# Patient Record
Sex: MALE | Race: WHITE | NOT HISPANIC OR LATINO | Employment: UNEMPLOYED | URBAN - METROPOLITAN AREA
[De-identification: names, ages, dates, MRNs, and addresses within clinical notes are randomized per-mention and may not be internally consistent; named-entity substitution may affect disease eponyms.]

---

## 2018-01-10 NOTE — MISCELLANEOUS
Provider Comments  Provider Comments:   Patient did not show for his 8/31 appointment    PL      Signatures   Electronically signed by : ERNESTO Plata ; Sep  1 2016  2:30PM EST                       (Author)

## 2018-01-15 NOTE — PROGRESS NOTES
Assessment    1  Family history of malignant neoplasm (V16 9) (Z80 9) : Father   2  Family history of lung cancer (V16 1) (Z80 1) : Father   3  Acute renal failure with tubular necrosis (584 5) (N17 0)   4  Benign essential hypertension (401 1) (I10)    Plan  Acute renal failure with tubular necrosis    · (1) BASIC METABOLIC PROFILE; Status:Active; Requested for:08Yxp2888;    Perform:[facility]; CAB:82NYV9288;LDXJNMR; For:Acute renal failure with tubular necrosis; Ordered By:Neeta Faye;   · (1) BASIC METABOLIC PROFILE; Status:Active; Requested for:50Rku4288;    Perform:[facility]; RMF:68VLX9287;WPIGQUO; For:Acute renal failure with tubular necrosis; Ordered By:Neeta Faye;   · (1) MAGNESIUM; Status:Active; Requested for:79Jwo1125;    Perform:[facility]; BAX:65ERF3040;JDMCQTF; For:Acute renal failure with tubular necrosis; Ordered By:Stella Faye;   · (1) URINALYSIS w URINE C/S REFLEX (will reflex a microscopy if leukocytes, occult  blood, or nitrites are not within normal limits); Status:Active; Requested for:29Psq4970;    Perform:[facility]; PDR:62IZP8197;IUHIXES; For:Acute renal failure with tubular necrosis; Ordered By:Neeta Faye;   · Follow-up visit in 6 weeks Evaluation and Treatment  Follow-up  Status: Complete  Done:  78QIG5350 10:06AM   Ordered; For: Acute renal failure with tubular necrosis; Ordered By: Jeet Aldana Performed:  Due: 15GBL0836; Last Updated By: Nani Ray; 7/14/2016 10:05:23 AM  Benign essential hypertension    · (1) LIPID PANEL, FASTING; Status:Active; Requested for:99Znt6094;    Perform:[facility]; TPS:80ANO8359;FQQHUJG; For:Benign essential hypertension; Ordered By:Neyda, Deronda Dye;    Discussion/Summary    #1  Acute kidney injury secondary to ATN in the setting of ketoacidosis, hypotensive shock with the use of ACE inhibitor, HCTZ and nonsteroidal anti-inflammatory drugs contributing   Patient has been on intermittent use of Indocin for control of gout symptoms for 25 years but states that he only uses it during a flare  He was instructed not to use this medication anymore  Acute kidney injury has resolved with last blood work showing a creatinine down to 0 9  Baseline creatinine unknown  Repeat Urinalysis was negative for hematuria and proteinuria  Renal ultrasound shows normal echogenicity, no hydronephrosis  Right kidney was 14 9 cm and left kidney was 13 5 cm  The initial ultrasound in the hospital showed mild proximal hydroureter but repeat CT evaluation was negative  I carefully explained to Jacques Luis that prevention of chronic kidney disease entails good blood pressure control, excellent blood sugar control and control of hyperlipidemia  I could not find any results of lipid testing therefore I am checking this with next blood work in August   #2  Hypertension: Pressure is slightly above goal at this time  He has had several blood pressure readings at different providers since hospital discharge in both of those readings were acceptable ~130/80-90  Elevation in blood pressure reading may be somewhat of a white coat/anxiety reaction  Patient was very worried about coming for this office visit since he felt he may be told that he may need to start dialysis  He is currently taking Coreg 25 mg twice a day and recently amlodipine 5 mg was added since his diastolic reading was in the upper 80s to 90 when he saw his primary care doctor  Before his hospitalization he was taking Benazepril which he will likely need to resume taking at some point but at this time I am holding off on restarting an ACE inhibitor since amlodipine was recently started  I will likely resume Benazepril at half the dose he was taking previously which is 20 mg daily (he was taking 40 mg previously)  #3  Gout  Patient instructed not to use Indocin for control of gout   I did suggest tart cherry but that has not worked for him in the past  He may need to use intermittent steroid dosing but then his blood sugars will escalate  #4  Volume status  Essentially no edema noted  Patient euvolemic  Plan: Repeat labs now and in 4-6 weeks before next appointment  If renal function remains stable and blood pressure is elevated we will likely add Benazepril  May need to discontinue amlodipine and substitute ACE inhibitor since in the long run this will benefit him more  Next appointment in 4-6 weeks  The patient, patient's family was counseled regarding diagnostic results, instructions for management, risk factor reductions, prognosis, patient and family education, impressions, importance of compliance with treatment  total time of encounter was 60 minutes and 40 minutes was spent counseling  Extended time explaining hypertension and prevention of CKD   Possible side effects of new medications were reviewed with the patient/guardian today  The treatment plan was reviewed with the patient/guardian  The patient/guardian understands and agrees with the treatment plan      Reason For Visit  Hospital follow-up      History of Present Illness  Mr Meghan Hodge is a very pleasant 51-year-old gentleman who had a significant hospitalization from May 29 to June 4  He was found confused and wandering in the streets near his home and was brought to the hospital and was found to have ketoacidosis with a blood sugar over 600 and a serum creatinine of 12  He had no previous history of diabetes mellitus  He did notice that he was drinking and urinating more but because of the warm weather he did not think much of it  Of note he had a 30 pound weight gain in the past 4 months since he hurt his shoulder  His past medical history was significant for hypertension for approximately 10 years  He was in profound shock and was placed on pressor support  He had increased troponin secondary to demand ischemia  Due to abdominal pain he had endoscopic evaluation finding gastric ulcer   He will be having a repeat endoscopic procedure in approximately a month or 2 for follow-up  His hospitalization was also attack of gout in both of his knees  He had fluid removed and his knees injected with steroids  Uric acid was elevated but declined to 7 6 on the day of discharge  He had significant hyperuricemia with uric crystals  He did not require hemodialysis during his hospitalization  On the day of discharge creatinine had declined to 1 88  He was initially discharged and had several days of rehabilitation before returning home  He has been feeling great  He lost a significant amount of weight in a very short time during his hospitalization but has gained approximately 10 pounds back  He would like to continue to lose weight which he knows will help his blood sugar and his hypertension control  He really has no other issues at this time  His blood sugars are showing better control on recently adjusted Levemir/NovoLog  This was a very frightening time for Kristen Torres and he seems very dedicated to changing lifestyle to benefit health  Review of Systems    Constitutional: recent 10 lb weight loss, but no fever, no chills, no anorexia and no fatigue  Integumentary: No complaints of skin rash  Gastrointestinal: No complains of abdominal pain, no constipation or diarrhea, no nausea or vomiting  Respiratory: No complaints of shortness of breath, no cough, no productive sputum  Cardiovascular: No complaints of orthopnea, no PND, no chest pain, no palpitations, no lower extremity edema  Musculoskeletal: No complaints of joint pain or swelling  Neurological: No complaints of headache, no lightheadedness or dizziness  Genitourinary: No dysuria, no hematuria, no nocturia, no urinary frequency, no incomplete emptying of bladder, no foamy urine  Eyes: No complaints of eyesight problems or dryness of eyes  ENT: no complaints of hearing loss, no nasal discharge     Psychiatric: Not suicidal, no sleep disturbance, no anxiety or depression, no change in personality, no emotional problems  Past Medical History    The active problems and past medical history were reviewed and updated today  Surgical History    The surgical history was reviewed and updated today  Family History    The family history was reviewed and updated today  Social History  The social history was reviewed and updated today  The social history was reviewed and is unchanged  Current Meds   1  Coreg 25 MG Oral Tablet; Take 1 tablet twice daily; Therapy: 01XHC8099 to Recorded   2  NovoLOG 100 UNIT/ML Subcutaneous Solution; INJECT 5 UNIT 3 times daily; Therapy: 35PFR5829 to Recorded    The medication list was reviewed and updated today  Allergies    1  No Known Drug Allergies    Vitals  Vital Signs [Data Includes: Current Encounter]    ** Printed in Appendix #1 below  Physical Exam    ENT: External ears and nose appear normal      Eyes: Anicteric sclerae  Neck: No bruit heard over either carotid  JVD:  No JVD present  Pulmonary: Respiratory effort: No increased work of breathing or signs of respiratory distress  Auscultation of lungs: Clear to auscultation  Cardiovascular: Auscultation of heart: Normal rate and rhythm, normal S1 and S2, without murmurs  Abdomen: Non-tender, no masses  Extremities: No cyanosis, clubbing or edema  Pulses: Dorsalis Pedis and Posterior Tibial pulses normal    Rash: No rash present  Neurologic: Non Focal      Psychiatric: Orientation to person, place, and time: Normal   and Mood and affect: Normal     Back: No CVA tenderness        Results/Data  2 WEEKS Results   (1) RENAL FUNCTION PANEL 13Qyi2008 10:08AM [place] [facility]    Order Number: [id number]  TW Order Number: [id number]  TW Order Number: [id number] Order Number: [id number]     Test Name Result Flag Reference   ANION GAP (CALC) 11 mmol/L  4-13   ALBUMIN 3 6 g/dL  3 5-5 0   BLOOD UREA NITROGEN 18 mg/dL  5-25   National Kidney Disease Education Program recommendations are as follows:  GFR calculation is accurate only with a steady state creatinine  Chronic Kidney disease less than 60 ml/min/1 73 sq  meters  Kidney failure less than 15 ml/min/1 73 sq  meters     CALCIUM 9 1 mg/dL  8 3-10 1   CHLORIDE 101 mmol/L  100-108   CARBON DIOXIDE 25 mmol/L  21-32   CREATININE 0 91 mg/dL  0 60-1 30   Standardized to IDMS reference method   GLUCOSE,RANDM 166 mg/dL H    POTASSIUM 3 9 mmol/L  3 5-5 3   eGFR Non-African American      >60 0 ml/min/1 73sq m   SODIUM 137 mmol/L  136-145   PHOSPHORUS 3 0 mg/dL  2 7-4 5     (1) CBC/ PLT (NO DIFF) 44VMJ1214 10:08AM Aravind Counter   TW Order Number: [id number]  TW Order Number: [id number]  TW Order Number: [id number] Order Number: [id number]     Test Name Result Flag Reference   HEMATOCRIT 39 7 %  36 5-49 3   HEMOGLOBIN 12 4 g/dL  12 0-17 0   MCHC 31 2 g/dL L 31 4-37 4   MCH 27 7 pg  26 8-34 3   MCV 89 fL  82-98   PLATELET COUNT 236 Thousands/uL  149-390   RBC COUNT 4 47 Million/uL  3 88-5 62   RDW 14 6 %  11 6-15 1   WBC COUNT 4 46 Thousand/uL  4 31-10 16   MPV 11 1 fL  8 9-12 7     (1) MAGNESIUM 38Mny4451 10:08AM Aravind Counter   TW Order Number: [id number]  TW Order Number: [id number]  TW Order Number: [id number] Order Number: [id number]     Test Name Result Flag Reference   MAGNESIUM 1 9 mg/dL  1 6-2 6     (1) URINALYSIS w URINE C/S REFLEX (will reflex a microscopy if leukocytes, occult blood, or nitrites are not within normal limits) 58TGW8204 10:08AM Aravind Counter   TW Order Number: [id number]  TW Order Number: [id number]     Test Name Result Flag Reference   COLOR Yellow     CLARITY Clear     PH UA 5 5  4 5-8 0   LEUKOCYTE ESTERASE UA Negative  Negative   NITRITE UA Negative  Negative   PROTEIN UA Negative mg/dl  Negative   GLUCOSE UA Negative mg/dl  Negative KETONES UA Negative mg/dl  Negative   UROBILINOGEN UA 0 2 E U /dl  0 2, 1 0 E U /dl   BILIRUBIN UA Negative  Negative   BLOOD UA Negative  Negative   SPECIFIC GRAVITY UA 1 025  1 003-1 030     (1) URINE PROTEIN CREATININE RATIO 99Vnm3849 10:08AM Olivia Shape   TW Order Number: [id number]  TW Order Number: [id number]  TW Order Number: [id number] Order Number: [id number]     Test Name Result Flag Reference   CREATININE URINE 136 0 mg/dL     URINE PROTEIN:CREATININE RATIO 0 10  0 00-0 10   URINE PROTEIN 14 mg/dL         Future Appointments    Date/Time Provider Specialty Site   2016 11:40 AM ERNESTO Ortega   Nephrology [address]     Signatures   Electronically signed by : KAMRAN Fitzgerald P ; 2016  5:25PM EST                       (Author)    Electronically signed by : ERNESTO Chaudhari ; Jul 15 2016  4:48PM EST                       (Author)    Appendix #1     Vital Signs Adonay De La Fuente Includes: Current Encounter]   PatientMelburn Canavan; : 1960; MRN: [id number]      Recorded: 26DPV0455 03:18PM Recorded: 81OEP1926 02:59PM Recorded: 13FKO9254 02:32PM   Heart Rate 88, Apical 88, Apical    Pulse Quality Regular, Apical     Systolic 144, LUE, Sitting 151    Diastolic 92, LUE, Sitting 92    Height   5 ft 9 in   Weight   247 lb    BMI Calculated   36 48   BSA Calculated   2 26

## 2020-07-13 ENCOUNTER — DOCUMENTATION (OUTPATIENT)
Dept: GASTROENTEROLOGY | Facility: CLINIC | Age: 60
End: 2020-07-13

## 2020-07-13 NOTE — LETTER
7/13/2020    Dear Lydia Head,    Review of our records shows you are due for the following:    Colonoscopy    Please call the following office to schedule your appointment:    [place]    We look forward to hearing from you      Sincerely,    Dr Chance Felix